# Patient Record
Sex: FEMALE | Race: WHITE
[De-identification: names, ages, dates, MRNs, and addresses within clinical notes are randomized per-mention and may not be internally consistent; named-entity substitution may affect disease eponyms.]

---

## 2020-02-11 ENCOUNTER — HOSPITAL ENCOUNTER (EMERGENCY)
Dept: HOSPITAL 72 - EMR | Age: 25
Discharge: HOME | End: 2020-02-11
Payer: SELF-PAY

## 2020-02-11 ENCOUNTER — HOSPITAL ENCOUNTER (EMERGENCY)
Dept: HOSPITAL 72 - EMR | Age: 25
Discharge: LEFT BEFORE DISCHARGE | End: 2020-02-11
Payer: SELF-PAY

## 2020-02-11 VITALS — DIASTOLIC BLOOD PRESSURE: 63 MMHG | SYSTOLIC BLOOD PRESSURE: 99 MMHG

## 2020-02-11 VITALS — DIASTOLIC BLOOD PRESSURE: 76 MMHG | SYSTOLIC BLOOD PRESSURE: 116 MMHG

## 2020-02-11 VITALS — SYSTOLIC BLOOD PRESSURE: 110 MMHG | DIASTOLIC BLOOD PRESSURE: 70 MMHG

## 2020-02-11 VITALS — WEIGHT: 100 LBS | HEIGHT: 61 IN | BODY MASS INDEX: 18.88 KG/M2

## 2020-02-11 VITALS — BODY MASS INDEX: 17.26 KG/M2 | WEIGHT: 80 LBS | HEIGHT: 57 IN

## 2020-02-11 VITALS — SYSTOLIC BLOOD PRESSURE: 102 MMHG | DIASTOLIC BLOOD PRESSURE: 60 MMHG

## 2020-02-11 DIAGNOSIS — R45.851: Primary | ICD-10-CM

## 2020-02-11 DIAGNOSIS — Z59.0: ICD-10-CM

## 2020-02-11 DIAGNOSIS — F20.9: ICD-10-CM

## 2020-02-11 DIAGNOSIS — Z53.29: ICD-10-CM

## 2020-02-11 DIAGNOSIS — F15.10: Primary | ICD-10-CM

## 2020-02-11 LAB
ADD MANUAL DIFF: NO
ALBUMIN SERPL-MCNC: 3.5 G/DL (ref 3.4–5)
ALBUMIN/GLOB SERPL: 1 {RATIO} (ref 1–2.7)
ALP SERPL-CCNC: 78 U/L (ref 46–116)
ALT SERPL-CCNC: 25 U/L (ref 12–78)
ANION GAP SERPL CALC-SCNC: 9 MMOL/L (ref 5–15)
APPEARANCE UR: CLEAR
APTT PPP: (no result) S
AST SERPL-CCNC: 15 U/L (ref 15–37)
BASOPHILS NFR BLD AUTO: 1.1 % (ref 0–2)
BILIRUB SERPL-MCNC: 0.1 MG/DL (ref 0.2–1)
BUN SERPL-MCNC: 9 MG/DL (ref 7–18)
CALCIUM SERPL-MCNC: 8.9 MG/DL (ref 8.5–10.1)
CHLORIDE SERPL-SCNC: 107 MMOL/L (ref 98–107)
CO2 SERPL-SCNC: 27 MMOL/L (ref 21–32)
CREAT SERPL-MCNC: 0.7 MG/DL (ref 0.55–1.3)
EOSINOPHIL NFR BLD AUTO: 3.2 % (ref 0–3)
ERYTHROCYTE [DISTWIDTH] IN BLOOD BY AUTOMATED COUNT: 11.5 % (ref 11.6–14.8)
GLOBULIN SER-MCNC: 3.5 G/DL
GLUCOSE UR STRIP-MCNC: NEGATIVE MG/DL
HCT VFR BLD CALC: 41 % (ref 37–47)
HGB BLD-MCNC: 14.5 G/DL (ref 12–16)
KETONES UR QL STRIP: NEGATIVE
LEUKOCYTE ESTERASE UR QL STRIP: (no result)
LYMPHOCYTES NFR BLD AUTO: 39.8 % (ref 20–45)
MCV RBC AUTO: 84 FL (ref 80–99)
MONOCYTES NFR BLD AUTO: 5 % (ref 1–10)
NEUTROPHILS NFR BLD AUTO: 50.9 % (ref 45–75)
NITRITE UR QL STRIP: NEGATIVE
PH UR STRIP: 6 [PH] (ref 4.5–8)
PLATELET # BLD: 400 K/UL (ref 150–450)
POTASSIUM SERPL-SCNC: 3.7 MMOL/L (ref 3.5–5.1)
PROT UR QL STRIP: NEGATIVE
RBC # BLD AUTO: 4.86 M/UL (ref 4.2–5.4)
SODIUM SERPL-SCNC: 143 MMOL/L (ref 136–145)
SP GR UR STRIP: 1.02 (ref 1–1.03)
UROBILINOGEN UR-MCNC: NORMAL MG/DL (ref 0–1)
WBC # BLD AUTO: 13.6 K/UL (ref 4.8–10.8)

## 2020-02-11 PROCEDURE — 81003 URINALYSIS AUTO W/O SCOPE: CPT

## 2020-02-11 PROCEDURE — 99284 EMERGENCY DEPT VISIT MOD MDM: CPT

## 2020-02-11 PROCEDURE — 80307 DRUG TEST PRSMV CHEM ANLYZR: CPT

## 2020-02-11 PROCEDURE — 36415 COLL VENOUS BLD VENIPUNCTURE: CPT

## 2020-02-11 PROCEDURE — 85025 COMPLETE CBC W/AUTO DIFF WBC: CPT

## 2020-02-11 PROCEDURE — 81025 URINE PREGNANCY TEST: CPT

## 2020-02-11 PROCEDURE — 99282 EMERGENCY DEPT VISIT SF MDM: CPT

## 2020-02-11 PROCEDURE — 80053 COMPREHEN METABOLIC PANEL: CPT

## 2020-02-11 PROCEDURE — 93005 ELECTROCARDIOGRAM TRACING: CPT

## 2020-02-11 SDOH — ECONOMIC STABILITY - HOUSING INSECURITY: HOMELESSNESS: Z59.0

## 2020-02-11 NOTE — EMERGENCY ROOM REPORT
History of Present Illness


General


Chief Complaint:  Suicidal


Source:  Patient





Present Illness


HPI


Patient was recently in the hospital had been dispositioned


Patient returns and checks back and reports that she feels suicidal


Patient reports that she has history of bipolar schizophrenia has not taken 

medication over the past


8 months





Denies any headache denies any chest pain


Patient does not have any specific plans


Denies any auditory or visual hallucinations does admit to doing 

methamphetamine earlier today


Allergies:  


Coded Allergies:  


     No Known Allergies (Unverified , 2/11/20)





Patient History


Past Medical History:  see triage record


Last Menstrual Period:  "I don't know"


Reviewed Nursing Documentation:  PMH: Agreed; PSxH: Agreed





Nursing Documentation-PMH


Past Medical History:  No History, Except For


Hx Cardiac Problems:  No - substance abuse


Hx Hypertension:  No


Hx Pacemaker:  No


Hx Asthma:  No


Hx COPD:  No


Hx Diabetes:  No


Hx Cancer:  No


Hx Gastrointestinal Problems:  No


Hx Dialysis:  No


History Of Psychiatric Problem:  No


Hx Neurological Problems:  No


Hx Cerebrovascular Accident:  No


Hx Seizures:  No





Review of Systems


All Other Systems:  negative except mentioned in HPI





Physical Exam





Vital Signs








  Date Time  Temp Pulse Resp B/P (MAP) Pulse Ox O2 Delivery O2 Flow Rate FiO2


 


2/11/20 08:15 97.3 81 16 99/63 (75) 94 Room Air  








Sp02 EP Interpretation:  reviewed, normal


General Appearance:  no apparent distress


Head:  normocephalic, atraumatic


Eyes:  bilateral eye PERRL, bilateral eye EOMI


ENT:  EOM grossly intact


Neck:  supple


Respiratory:  lungs clear, no respiratory distress, no retraction


Cardiovascular #1:  regular rate, rhythm


Gastrointestinal:  non tender, soft


Musculoskeletal:  normal inspection - Ambulating without focal deficit


Neurologic:  alert, oriented x3


Psychiatric:  other - Upon arrival reported suicidal thoughts denies any visual 

or auditory hallucinations


Skin:  no rash





Medical Decision Making


Diagnostic Impression:  


 Primary Impression:  


 ama


ER Course


Upon arrival social work and psychiatric services were consulted


Patient is allowed to rest


And reports taking Seroquel previously therefore this was prescribed


During her wait for social work patient begins to yell for food


Social work did present and after further discussion patient and she did not


Want to speak to social work


Again starting yelling at the social work and myself


Patient at this time reports that she does not have any suicidal ideations


And she wants a bus pass


Social work also attempt to provide patient with outpatient resources and 

patient left prior to further evaluation





Last Vital Signs








  Date Time  Temp Pulse Resp B/P (MAP) Pulse Ox O2 Delivery O2 Flow Rate FiO2


 


2/11/20 08:35 97.3 81 16 102/60 96 Room Air  








Status:  unchanged


Disposition:  AGAINST MEDICAL ADVICE


Condition:  Improved


Referrals:  


NOT CHOSEN IPA/MD,REFERRING (PCP)











Blas Moran DO Feb 11, 2020 09:57

## 2020-02-11 NOTE — NUR
AMA:

SEE AMA FORM.PT became angry and verbally aggressive toward social service and 
ERMD and stated she just wants to leave instead. Bus token provided. pt walked 
out of ED in steady gait.

## 2020-02-11 NOTE — EMERGENCY ROOM REPORT
History of Present Illness


General


Chief Complaint:  Behavioral Complaint


Source:  Patient, EMS





Present Illness


HPI


Disclaimer: Please note that this report is being documented using DRAGON 

technology. This can lead to erroneous entry secondary to incorrect 

interpretation by the dictating instrument.





HPI: 24-year-old female history of schizophrenia presents for evaluation of 

suicidal ideation.  She is brought in by [facility] no legal status.  She does not 

take any psychiatric medications or follows with psychiatry.  She keeps anxious 

suicidal but states she has no plan.  She has not ingested any chemicals or 

pills.  Has not attempted any self-harm behavior.  She states she just like to 

sleep in the emergency department.  States it is too cold to sleep outside.  

Denies any chest pain, palpitation, shortness of breath, abdominal pain, 

vomiting, diarrhea, headaches or other changes in her health this time.  She 

wants to be left alone to sleep.


 


PMH: Schizophrenia


 


PSH: Denies


 


Allergies: Denies


 


Social Hx: Denies


Allergies:  


Coded Allergies:  


     No Known Allergies (Unverified , 2/11/20)





Patient History


Pregnant Now:  No





Nursing Documentation-PMH


Past Medical History:  No History, Except For





Review of Systems


All Other Systems:  negative except mentioned in HPI





Physical Exam





Vital Signs








  Date Time  Temp Pulse Resp B/P (MAP) Pulse Ox O2 Delivery O2 Flow Rate FiO2


 


2/11/20 02:29 98.4 97 18 110/70 (83) 99 Room Air  





 





General: Awake and alert, no acute distress


HEENT: NC/AT. EOMI. moist mucous membranes


Cardiovascular: RRR.  S1 and S2 normal.  No murmur appreciated


Resp: Normal work of breathing. No cough, wheezing or crackles appreciated


Abdomen: Abdomen is soft, nondistended.  Nontender


Skin: Intact.  No abrasions, laceration or rash over the exposed skin


MSK: Normal tone and bulk. Moving all extremities.  No obvious deformity.


Neuro: Awake and alert.  Mentating appropriately.





Medical Decision Making


Homeless Attestation


Patient has been medically screened and is stable for outpatient follow up


Diagnostic Impression:  


 Primary Impression:  


 Amphetamine abuse


ER Course


24-year-old homeless female with a reported history of schizophrenia but not 

taking any psychiatric medications presents for suicidal ideation.  Patient 

does not have a plan not attempt for behavior.  She would like to sleep in the 

emergency department until the morning.  States it is too cold to be outside.  

Will start psychiatric screening labs and reevaluate in the morning.





Laboratory Tests








Test


  2/11/20


02:44


 


White Blood Count


  13.6 K/UL


(4.8-10.8)  H


 


Red Blood Count


  4.86 M/UL


(4.20-5.40)


 


Hemoglobin


  14.5 G/DL


(12.0-16.0)


 


Hematocrit


  41.0 %


(37.0-47.0)


 


Mean Corpuscular Volume 84 FL (80-99)  


 


Mean Corpuscular Hemoglobin


  29.8 PG


(27.0-31.0)


 


Mean Corpuscular Hemoglobin


Concent 35.4 G/DL


(32.0-36.0)


 


Red Cell Distribution Width


  11.5 %


(11.6-14.8)  L


 


Platelet Count


  400 K/UL


(150-450)


 


Mean Platelet Volume


  5.4 FL


(6.5-10.1)  L


 


Neutrophils (%) (Auto)


  50.9 %


(45.0-75.0)


 


Lymphocytes (%) (Auto)


  39.8 %


(20.0-45.0)


 


Monocytes (%) (Auto)


  5.0 %


(1.0-10.0)


 


Eosinophils (%) (Auto)


  3.2 %


(0.0-3.0)  H


 


Basophils (%) (Auto)


  1.1 %


(0.0-2.0)


 


Urine Color Pale yellow  


 


Urine Appearance Clear  


 


Urine pH 6 (4.5-8.0)  


 


Urine Specific Gravity


  1.025


(1.005-1.035)


 


Urine Protein


  Negative


(NEGATIVE)


 


Urine Glucose (UA)


  Negative


(NEGATIVE)


 


Urine Ketones


  Negative


(NEGATIVE)


 


Urine Blood


  Negative


(NEGATIVE)


 


Urine Nitrite


  Negative


(NEGATIVE)


 


Urine Bilirubin


  Negative


(NEGATIVE)


 


Urine Urobilinogen


  Normal MG/DL


(0.0-1.0)


 


Urine Leukocyte Esterase


  1+ (NEGATIVE)


H


 


Urine RBC


  0-2 /HPF (0 -


2)


 


Urine WBC


  0-2 /HPF (0 -


2)


 


Urine Squamous Epithelial


Cells Many /LPF


(NONE/OCC)  H


 


Urine Bacteria


  Few /HPF


(NONE)


 


Urine HCG, Qualitative


  Negative


(NEGATIVE)


 


Sodium Level


  143 MMOL/L


(136-145)


 


Potassium Level


  3.7 MMOL/L


(3.5-5.1)


 


Chloride Level


  107 MMOL/L


()


 


Carbon Dioxide Level


  27 MMOL/L


(21-32)


 


Anion Gap


  9 mmol/L


(5-15)


 


Blood Urea Nitrogen


  9 mg/dL (7-18)


 


 


Creatinine


  0.7 MG/DL


(0.55-1.30)


 


Estimate Glomerular


Filtration Rate > 60 mL/min


(>60)


 


Glucose Level


  97 MG/DL


()


 


Calcium Level


  8.9 MG/DL


(8.5-10.1)


 


Total Bilirubin


  0.1 MG/DL


(0.2-1.0)  L


 


Aspartate Amino Transferase


(AST) 15 U/L (15-37)


 


 


Alanine Aminotransferase (ALT)


  25 U/L (12-78)


 


 


Alkaline Phosphatase


  78 U/L


()


 


Total Protein


  7.0 G/DL


(6.4-8.2)


 


Albumin


  3.5 G/DL


(3.4-5.0)


 


Globulin 3.5 g/dL  


 


Albumin/Globulin Ratio 1.0 (1.0-2.7)  


 


Salicylates Level


  2.4 ug/mL


(2.8-20)  L


 


Urine Opiates Screen


  Negative


(NEGATIVE)


 


Acetaminophen Level


  < 2 MCG/ML


(10-30)  L


 


Urine Barbiturates Screen


  Negative


(NEGATIVE)


 


Phencyclidine (PCP) Screen


  Negative


(NEGATIVE)


 


Urine Amphetamines Screen


  Positive


(NEGATIVE)  H


 


Urine Benzodiazepines Screen


  Negative


(NEGATIVE)


 


Urine Cocaine Screen


  Negative


(NEGATIVE)


 


Urine Marijuana (THC) Screen


  Negative


(NEGATIVE)


 


Serum Alcohol < 3 mg/dL  








EKG Diagnostic Results


EKG Time:  02:38


Rate:  normal


Rhythm:  NSR


ST Segments:  no acute changes


Other Impression


Sinus rhythm, normal axis, normal intervals, no ST segment changes





Rhythm Strip Diag. Results


Rhythm Strip Time:  02:38


EP Interpretation:  yes


Rate:  80s


Rhythm:  NSR, no PVC's, no ectopy


Reevaluation Time:  06:43





Last Vital Signs








  Date Time  Temp Pulse Resp B/P (MAP) Pulse Ox O2 Delivery O2 Flow Rate FiO2


 


2/11/20 02:38  97 18   Room Air  


 


2/11/20 02:38 98.4   110/70 99   








Reevaluation Impression


Patient has been monitored in emergency department roughly 4 hours.  She has 

been sleeping the entire time.  She has now awake alert and requesting to go to 

rehab.  We provided several outpatient options for her to get in contact with 

which she says she was due.  She denies any suicidality at this time.  Again 

she was mostly stating she needed a place to stay for the night because it was 

cold out but now she would like to go to rehab on an outpatient basis.  She had 

no plan to harm herself at any point during the emergency department visit.  

She can follow-up on an outpatient basis I encouraged her to follow-up with PMD 

and psychiatry as well as substance abuse counselors.  Discussed reasons to 

return to the emergency department.  She understands and agrees.


Disposition:  HOME, SELF-CARE


Condition:  Stable


Referrals:  


NOT CHOSEN IPA/MD,REFERRING (PCP)











Jamaal Curry MD Feb 11, 2020 03:34

## 2020-02-11 NOTE — NUR
ER Nurse Note:



Pt calm, cooperative, no signs of distress. Pt asleep. Relocated to Ortho room; 
report given to ALVIN Aguirre. Resources for rehab given.

## 2020-02-11 NOTE — NUR
NOTE



ALEXANDRA was notified that pt requested to see SW. SW met w/ pt and assessed pt's needs. Pt 
presents as agitated, aggressive and uncooperative. Pt was yelling, stating she is suicidal 
but she has no suicidal plan. ALEXANDRA encouraged pt to F/U w/ outpatient [facility]. PT declined to 
receive mental health resource packet. Pt was screaming that she cannot walk. SW witnessed 
pt standing and walking around. Pt is medically cleared and does not need DME. Pt requested 
SW to make a referral to substance abuse rehab program. ALEXANDRA explained admission process and 
encouraged pt to call [facility]stance [facility] Services Helpline. Pt also declined to 
receive the list of winter shelters but is wiling to accept a tap card upon DC.  Pt to be 
discharged her preferred location. 


-------------------------------------------------------------------------------

Signed:    02/11/20 at 0933 by KIRBY MORRIS <Co-Signature Required>

-------------------------------------------------------------------------------

## 2020-02-11 NOTE — NUR
ER Nurse Note:



Pt brought in by ambulance c/o behavior complaint since unk time. Pt stated she 
wants to referals for drug rehab. Pt denies shortness of breath, chest pain, no 
signs of distress. Pt ambulatory. Blood drawn, pt refused to keep IV. Pt 
cooperative. Will continue to [facility].

## 2020-02-11 NOTE — NUR
ED Nurse Note:

PT walked in to ED for C/O being depressed and need info for shelter and food.  
PT states she wants to hurt her self but does not have a plan.

## 2020-05-15 ENCOUNTER — HOSPITAL ENCOUNTER (EMERGENCY)
Dept: HOSPITAL 54 - ER | Age: 25
LOS: 1 days | Discharge: PSYCHIATRIC OR SUBSTANCE USE FACILITY | End: 2020-05-16
Payer: MEDICAID

## 2020-05-15 VITALS — DIASTOLIC BLOOD PRESSURE: 64 MMHG | SYSTOLIC BLOOD PRESSURE: 106 MMHG

## 2020-05-15 VITALS — BODY MASS INDEX: 20.09 KG/M2 | WEIGHT: 125 LBS | HEIGHT: 66 IN

## 2020-05-15 DIAGNOSIS — F15.10: ICD-10-CM

## 2020-05-15 DIAGNOSIS — R45.851: Primary | ICD-10-CM

## 2020-05-15 DIAGNOSIS — F31.9: ICD-10-CM

## 2020-05-15 DIAGNOSIS — N39.0: ICD-10-CM

## 2020-05-15 DIAGNOSIS — F20.9: ICD-10-CM

## 2020-05-15 LAB
ALBUMIN SERPL BCP-MCNC: 3.5 G/DL (ref 3.4–5)
ALP SERPL-CCNC: 61 U/L (ref 46–116)
ALT SERPL W P-5'-P-CCNC: 19 U/L (ref 12–78)
APAP SERPL-MCNC: 0 UG/ML (ref 10–30)
APPEARANCE UR: CLEAR
AST SERPL W P-5'-P-CCNC: 11 U/L (ref 15–37)
BASOPHILS # BLD AUTO: 0.1 /CMM (ref 0–0.2)
BASOPHILS NFR BLD AUTO: 0.8 % (ref 0–2)
BILIRUB DIRECT SERPL-MCNC: 0.1 MG/DL (ref 0–0.2)
BILIRUB SERPL-MCNC: 0.4 MG/DL (ref 0.2–1)
BILIRUB UR QL STRIP: NEGATIVE
BUN SERPL-MCNC: 12 MG/DL (ref 7–18)
CALCIUM SERPL-MCNC: 8.6 MG/DL (ref 8.5–10.1)
CHLORIDE SERPL-SCNC: 104 MMOL/L (ref 98–107)
CO2 SERPL-SCNC: 32 MMOL/L (ref 21–32)
COLOR UR: YELLOW
CREAT SERPL-MCNC: 1 MG/DL (ref 0.6–1.3)
DEPRECATED SQUAMOUS URNS QL MICRO: (no result) /HPF
EOSINOPHIL NFR BLD AUTO: 3.3 % (ref 0–6)
ETHANOL SERPL-MCNC: < 3 MG/DL (ref 0–0)
GLUCOSE SERPL-MCNC: 74 MG/DL (ref 74–106)
GLUCOSE UR STRIP-MCNC: NEGATIVE MG/DL
HCT VFR BLD AUTO: 39 % (ref 33–45)
HGB BLD-MCNC: 13.3 G/DL (ref 11.5–14.8)
HGB UR QL STRIP: (no result) ERY/UL
KETONES UR STRIP-MCNC: (no result) MG/DL
LEUKOCYTE ESTERASE UR QL STRIP: NEGATIVE
LYMPHOCYTES NFR BLD AUTO: 3.2 /CMM (ref 0.8–4.8)
LYMPHOCYTES NFR BLD AUTO: 38.9 % (ref 20–44)
MCHC RBC AUTO-ENTMCNC: 34 G/DL (ref 31–36)
MCV RBC AUTO: 84 FL (ref 82–100)
MONOCYTES NFR BLD AUTO: 0.6 /CMM (ref 0.1–1.3)
MONOCYTES NFR BLD AUTO: 7.7 % (ref 2–12)
NEUTROPHILS # BLD AUTO: 4.1 /CMM (ref 1.8–8.9)
NEUTROPHILS NFR BLD AUTO: 49.3 % (ref 43–81)
NITRITE UR QL STRIP: NEGATIVE
PH UR STRIP: 7 [PH] (ref 5–8)
PLATELET # BLD AUTO: 344 /CMM (ref 150–450)
POTASSIUM SERPL-SCNC: 3.9 MMOL/L (ref 3.5–5.1)
PROT SERPL-MCNC: 6.9 G/DL (ref 6.4–8.2)
PROT UR QL STRIP: 30 MG/DL
RBC # BLD AUTO: 4.65 MIL/UL (ref 4–5.2)
SALICYLATES SERPL-MCNC: 2.2 MG/DL (ref 2.8–20)
SODIUM SERPL-SCNC: 141 MMOL/L (ref 136–145)
UROBILINOGEN UR STRIP-MCNC: 0.2 EU/DL
WBC #/AREA URNS HPF: (no result) /HPF
WBC #/AREA URNS HPF: (no result) /HPF (ref 0–3)
WBC NRBC COR # BLD AUTO: 8.3 K/UL (ref 4.3–11)

## 2020-05-15 PROCEDURE — 80329 ANALGESICS NON-OPIOID 1 OR 2: CPT

## 2020-05-15 PROCEDURE — 84702 CHORIONIC GONADOTROPIN TEST: CPT

## 2020-05-15 PROCEDURE — 80305 DRUG TEST PRSMV DIR OPT OBS: CPT

## 2020-05-15 PROCEDURE — 80307 DRUG TEST PRSMV CHEM ANLYZR: CPT

## 2020-05-15 PROCEDURE — G0480 DRUG TEST DEF 1-7 CLASSES: HCPCS

## 2020-05-15 PROCEDURE — 85025 COMPLETE CBC W/AUTO DIFF WBC: CPT

## 2020-05-15 PROCEDURE — 81001 URINALYSIS AUTO W/SCOPE: CPT

## 2020-05-15 PROCEDURE — 36415 COLL VENOUS BLD VENIPUNCTURE: CPT

## 2020-05-15 PROCEDURE — 99285 EMERGENCY DEPT VISIT HI MDM: CPT

## 2020-05-15 PROCEDURE — 80048 BASIC METABOLIC PNL TOTAL CA: CPT

## 2020-05-15 PROCEDURE — 87086 URINE CULTURE/COLONY COUNT: CPT

## 2020-05-15 PROCEDURE — 80076 HEPATIC FUNCTION PANEL: CPT

## 2020-05-15 RX ADMIN — CEPHALEXIN ONE MG: 500 CAPSULE ORAL at 17:35
